# Patient Record
Sex: FEMALE | Race: WHITE | Employment: FULL TIME | URBAN - METROPOLITAN AREA
[De-identification: names, ages, dates, MRNs, and addresses within clinical notes are randomized per-mention and may not be internally consistent; named-entity substitution may affect disease eponyms.]

---

## 2018-09-30 ENCOUNTER — OFFICE VISIT (OUTPATIENT)
Dept: URGENT CARE | Facility: URGENT CARE | Age: 33
End: 2018-09-30
Payer: COMMERCIAL

## 2018-09-30 ENCOUNTER — RADIANT APPOINTMENT (OUTPATIENT)
Dept: GENERAL RADIOLOGY | Facility: CLINIC | Age: 33
End: 2018-09-30
Attending: NURSE PRACTITIONER
Payer: COMMERCIAL

## 2018-09-30 VITALS
WEIGHT: 148.2 LBS | HEART RATE: 68 BPM | TEMPERATURE: 97.7 F | DIASTOLIC BLOOD PRESSURE: 62 MMHG | SYSTOLIC BLOOD PRESSURE: 104 MMHG | OXYGEN SATURATION: 100 %

## 2018-09-30 DIAGNOSIS — S69.91XA INJURY OF FINGER OF RIGHT HAND, INITIAL ENCOUNTER: ICD-10-CM

## 2018-09-30 DIAGNOSIS — S69.91XA INJURY OF FINGER OF RIGHT HAND, INITIAL ENCOUNTER: Primary | ICD-10-CM

## 2018-09-30 PROCEDURE — 73140 X-RAY EXAM OF FINGER(S): CPT | Mod: RT

## 2018-09-30 PROCEDURE — 99203 OFFICE O/P NEW LOW 30 MIN: CPT | Performed by: NURSE PRACTITIONER

## 2018-09-30 NOTE — MR AVS SNAPSHOT
After Visit Summary   9/30/2018    Nataliia Cheatham    MRN: [id number]           Patient Information     Date Of Birth          1985        Visit Information        Provider Department      9/30/2018 9:05 AM Kayla Dodson APRN [facility]        Today's Diagnoses     Injury of finger of right hand, initial encounter    -  1      Care Instructions      Finger Contusion  You have a contusion. This is also called a bruise. There is swelling and some bleeding under the skin, but no broken bones. This injury generally takes a few days to a few weeks to heal. During that time, the bruise will typically change in color from reddish, to purple-blue, to greenish-yellow, then to yellow-brown.  A finger contusion may be treated with a splint or anjelica tape (taping the injured finger to the one next to it for support). Minor contusions likely will need no other treatment.  Home care    Elevate the hand to reduce pain and swelling. As much as possible, sit or lie down with the hand raised about the level of your heart. This is especially important during the first 48 hours.    Ice the finger to help reduce pain and swelling. Wrap a cold source (ice pack or ice cubes in a plastic bag) in a thin towel. Apply to the bruised finger for 20 minutes every 1 to 2 hours the first day. Continue this 3 to 4 times a day until the pain and swelling goes away.    If anjelica tape was applied and it becomes wet or dirty, change it. You may replace it with paper, plastic, or cloth tape. Before taping, put a thin strip of cotton or gauze between the fingers to absorb sweat. This will help prevent any breakdown of skin or fungal infections.     Unless another medicine was prescribed, you can take acetaminophen, ibuprofen, or naproxen to control pain. (If you have chronic liver or kidney disease or ever had a stomach ulcer or gastrointestinal bleeding, talk with your doctor before using these  medicines.)  Follow up  Follow up with your healthcare provider, or as advised. Call if you are not improving within 1 to 2 weeks.  When to seek medical advice   Call your healthcare provider right away if you have any of the following:    Increased pain or swelling    Hand or arm becomes cold, blue, numb or tingly    Signs of infection: Warmth, drainage, or increased redness or pain around the bruise    Inability to move the injured finger or hand     Frequent bruising for unknown reasons  Date Last Reviewed: 5/1/2017 2000-2017 [employer]. [address] [place] [address]. All rights reserved. This information is not intended as a substitute for professional medical care. Always follow your healthcare professional's instructions.                Follow-ups after your visit        Who to contact     If you have questions or need follow up information about today's clinic visit or your schedule please contact [facility] directly at [phone].  Normal or non-critical lab and imaging results will be communicated to you by MyChart, letter or phone within 4 business days after the clinic has received the results. If you do not hear from us within 7 days, please contact the clinic through MyChart or phone. If you have a critical or abnormal lab result, we will notify you by phone as soon as possible.  Submit refill requests through [employer] or call your pharmacy and they will forward the refill request to us. Please allow 3 business days for your refill to be completed.          Additional Information About Your Visit        Care EveryWhere ID     This is your Care EveryWhere ID. This could be used by other organizations to access your [place] medical records  [id number]        Your Vitals Were     Pulse Temperature Pulse Oximetry             68 97.7  F (36.5  C) (Tympanic) 100%          Blood Pressure from Last 3 Encounters:   09/30/18 104/62    Weight from Last 3 Encounters:    09/30/18 148 lb 3.2 oz (67.2 kg)               Primary Care Provider    None Specified       No primary provider on file.        Equal Access to Services     VITO MADRIGAL : Hadii aad ku hadphaniaidan Leylastella, lorrainequentin franzjinha, dane randayandy serrano, dorita dunn giorgicuauhtemoc cain laLukaszshivam alina. So [facility] [phone].    ATENCIÓN: Si habla español, tiene a bowling disposición servicios gratuitos de asistencia lingüística. Llame al [phone].    We comply with applicable federal civil rights laws and [place] laws. We do not discriminate on the basis of race, color, national origin, age, disability, sex, sexual orientation, or gender identity.            Thank you!     Thank you for [facility]. Our goal is always to provide you with excellent care. Hearing back from our patients is one way we can continue to improve our services. Please take a few minutes to complete the written survey that you may receive in the mail after your visit with us. Thank you!             Your Updated Medication List - Protect others around you: Learn how to safely use, store and throw away your medicines at www.disposemymeds.org.      Notice  As of 9/30/2018 10:34 AM    You have not been prescribed any medications.

## 2018-09-30 NOTE — PROGRESS NOTES
SUBJECTIVE:  Nataliia Cheatham is a 33 year old female who sustained a right finger injury 1 hours ago. Mechanism of injury: slammed in car door. Immediate symptoms: immediate pain, immediate swelling. Symptoms have been improving since that time. Prior history of related problems: no prior problems with this area in the past.    No past medical history on file.  No current outpatient prescriptions on file.     No current facility-administered medications for this visit.       No Known Allergies    Social History     Social History     Marital status:      Spouse name: N/A     Number of children: N/A     Years of education: N/A     Social History Main Topics     Smoking status: Not on file     Smokeless tobacco: Not on file     Alcohol use Not on file     Drug use: Not on file     Sexual activity: Not on file     Other Topics Concern     Not on file     Social History Narrative       OBJECTIVE:  /62  Pulse 68  Temp 97.7  F (36.5  C) (Tympanic)  Wt 148 lb 3.2 oz (67.2 kg)  SpO2 100%  Appearance: in no apparent distress.  Hand exam: soft tissue tenderness and swelling 2nd digit right hand, reduced range of motion of at knuckle  X-ray: no fracture or dislocation noted.    ASSESSMENT:  Finger contusion    PLAN:  Finger splint applied  NSAID, ice suggested    Home treat and monitor symptoms, call or rtc if worsening or not improving      ZAN Overton CNP    
Doris Barton (), Residency  Admin  [address] [place] [address]  Phone: [phone]  Fax: [phone]

## 2018-09-30 NOTE — PATIENT INSTRUCTIONS
Finger Contusion  You have a contusion. This is also called a bruise. There is swelling and some bleeding under the skin, but no broken bones. This injury generally takes a few days to a few weeks to heal. During that time, the bruise will typically change in color from reddish, to purple-blue, to greenish-yellow, then to yellow-brown.  A finger contusion may be treated with a splint or anjelica tape (taping the injured finger to the one next to it for support). Minor contusions likely will need no other treatment.  Home care    Elevate the hand to reduce pain and swelling. As much as possible, sit or lie down with the hand raised about the level of your heart. This is especially important during the first 48 hours.    Ice the finger to help reduce pain and swelling. Wrap a cold source (ice pack or ice cubes in a plastic bag) in a thin towel. Apply to the bruised finger for 20 minutes every 1 to 2 hours the first day. Continue this 3 to 4 times a day until the pain and swelling goes away.    If anjelica tape was applied and it becomes wet or dirty, change it. You may replace it with paper, plastic, or cloth tape. Before taping, put a thin strip of cotton or gauze between the fingers to absorb sweat. This will help prevent any breakdown of skin or fungal infections.     Unless another medicine was prescribed, you can take acetaminophen, ibuprofen, or naproxen to control pain. (If you have chronic liver or kidney disease or ever had a stomach ulcer or gastrointestinal bleeding, talk with your doctor before using these medicines.)  Follow up  Follow up with your healthcare provider, or as advised. Call if you are not improving within 1 to 2 weeks.  When to seek medical advice   Call your healthcare provider right away if you have any of the following:    Increased pain or swelling    Hand or arm becomes cold, blue, numb or tingly    Signs of infection: Warmth, drainage, or increased redness or pain around the  bruise    Inability to move the injured finger or hand     Frequent bruising for unknown reasons  Date Last Reviewed: 5/1/2017 2000-2017 [employer]. [address] [place] [address], [place] [address]. All rights reserved. This information is not intended as a substitute for professional medical care. Always follow your healthcare professional's instructions.

## 2020-01-13 ENCOUNTER — NURSE TRIAGE (OUTPATIENT)
Dept: FAMILY MEDICINE | Facility: CLINIC | Age: 35
End: 2020-01-13

## 2020-01-13 NOTE — TELEPHONE ENCOUNTER
Patient calling is having heart palpitations, on and off since Friday. Sending caller to triage.   Patient hung up please call patient at number listed. Thank you

## 2020-01-13 NOTE — TELEPHONE ENCOUNTER
"  Reason for Disposition    [1] Palpitations AND [2] no improvement after using CARE ADVICE    Additional Information    Negative: Passed out (i.e., lost consciousness, collapsed and was not responding)    Negative: Shock suspected (e.g., cold/pale/clammy skin, too weak to stand, low BP, rapid pulse)    Negative: Difficult to awaken or acting confused (e.g., disoriented, slurred speech)    Negative: Visible sweat on face or sweat dripping down face    Negative: Unable to walk, or can only walk with assistance (e.g., requires support)    Negative: [1] Received SHOCK from implantable cardiac defibrillator AND [2] persisting symptoms (i.e., palpitations, lightheadedness)    Negative: Sounds like a life-threatening emergency to the triager    Negative: Difficulty breathing    Negative: Dizziness, lightheadedness, or weakness    Negative: [1] Heart beating very rapidly (e.g., > 140 / minute) AND [2] present now  (Exception: during exercise)    Negative: Heart beating very slowly (e.g., < 50 / minute)  (Exception: athlete)    Negative: New or worsened shortness of breath with activity (dyspnea on exertion)    Negative: Patient sounds very sick or weak to the triager    Negative: [1] Heart beating very rapidly (e.g., > 140 / minute) AND [2] not present now  (Exception: during exercise)    Negative: [1] Skipped or extra beat(s) AND [2] increases with exercise or exertion    Negative: [1] Skipped or extra beat(s) AND [2] occurs 4 or more times per minute    Negative: New or worsened ankle swelling    Negative: History of heart disease  (i.e., heart attack, bypass surgery, angina, angioplasty, CHF) (Exception: brief heart beat symptoms that went away and now feels well)    Negative: Age > 60 years (Exception: brief heart beat symptoms that went away and now feels well)    Negative: Taking water pill (i.e., diuretic) or heart medication (e.g., digoxin)    Negative: Wearing a \"holter monitor\" or \"cardiac event monitor\"    " "Negative: [1] Received SHOCK from implantable cardiac defibrillator AND [2] now feels well    Answer Assessment - Initial Assessment Questions  1. DESCRIPTION: \"Please describe your heart rate or heart beat that you are having\" (e.g., fast/slow, regular/irregular, skipped or extra beats, \"palpitations\")      Feels like an extra beat, on and off, worse on Sat.  Less today. randomly  2. ONSET: \"When did it start?\" (Minutes, hours or days)       Friday  3. DURATION: \"How long does it last\" (e.g., seconds, minutes, hours)      Today for a couple of sec, on weekend for minutes  4. PATTERN \"Does it come and go, or has it been constant since it started?\"  \"Does it get worse with exertion?\"   \"Are you feeling it now?\"      random  5. TAP: \"Using your hand, can you tap out what you are feeling on a chair or table in front of you, so that I can hear?\" (Note: not all patients can do this)        Not asked  6. HEART RATE: \"Can you tell me your heart rate?\" \"How many beats in 15 seconds?\"  (Note: not all patients can do this)        Not racing.   7. RECURRENT SYMPTOM: \"Have you ever had this before?\" If so, ask: \"When was the last time?\" and \"What happened that time?\"       no  8. CAUSE: \"What do you think is causing the palpitations?\"      Unknown.  9. CARDIAC HISTORY: \"Do you have any history of heart disease?\" (e.g., heart attack, angina, bypass surgery, angioplasty, arrhythmia)       None, no family hx of young cardiac issues.  10. OTHER SYMPTOMS: \"Do you have any other symptoms?\" (e.g., dizziness, chest pain, sweating, difficulty breathing)        No other sx.   11. PREGNANCY: \"Is there any chance you are pregnant?\" \"When was your last menstrual period?\"        no    Protocols used: HEART RATE AND HEARTBEAT [id number]    "

## 2020-01-13 NOTE — PROGRESS NOTES
"Subjective     Nataliia Cheatham is a 34 year old female who presents to clinic today for the following health issues:    HPI   palpatations       Duration: 5 days     Description (location/character/radiation): feels  Strong beat of heart about every 20-30    Intensity:  Less in the past few days and none today     Accompanying signs and symptoms: none    History (similar episodes/previous evaluation): None    Precipitating or alleviating factors: None    Therapies tried and outcome: slow breathing, stay calm, exercise     Pt with intermittent palpitations starting about 5 days ago  Has been decreasing since then  None felt today  Seemed like it beat harder every 20-30 beats  Mainly noticed when she sat down.   No family history of cardiac problems  No other symptoms with this  No chest pain/tightness/sob/dizziness with this        Reviewed and updated as needed this visit by Provider         Review of Systems   ROS COMP: Constitutional, HEENT, cardiovascular, pulmonary, gi and gu systems are negative, except as otherwise noted.      Objective    /63   Pulse 81   Temp 98.6  F (37  C) (Oral)   Ht 1.715 m (5' 7.5\")   Wt 63.5 kg (140 lb)   BMI 21.60 kg/m    Body mass index is 21.6 kg/m .  Physical Exam   GENERAL: healthy, alert and no distress  NECK: no adenopathy, no asymmetry, masses, or scars and thyroid normal to palpation  RESP: lungs clear to auscultation - no rales, rhonchi or wheezes  CV: regular rate and rhythm, normal S1 S2, no S3 or S4, no murmur, click or rub, no peripheral edema and peripheral pulses strong  ABDOMEN: soft, nontender, no hepatosplenomegaly, no masses and bowel sounds normal  MS: no gross musculoskeletal defects noted, no edema    Diagnostic Test Results:  Labs reviewed in Epic  EKG - negative except PVC        Assessment & Plan     1. Palpitations  Probably benign PVCs, reassured pt. Will check labwork. Follow-up if worsens or is bothersome  - EKG 12-lead complete w/read - " Clinics  - Comprehensive metabolic panel  - CBC with platelets  - TSH with free T4 reflex           No follow-ups on file.    Lashawn Watts MD  [facility]

## 2020-01-14 ENCOUNTER — OFFICE VISIT (OUTPATIENT)
Dept: FAMILY MEDICINE | Facility: CLINIC | Age: 35
End: 2020-01-14
Payer: COMMERCIAL

## 2020-01-14 VITALS
SYSTOLIC BLOOD PRESSURE: 107 MMHG | HEART RATE: 81 BPM | BODY MASS INDEX: 21.22 KG/M2 | HEIGHT: 68 IN | DIASTOLIC BLOOD PRESSURE: 63 MMHG | WEIGHT: 140 LBS | TEMPERATURE: 98.6 F

## 2020-01-14 DIAGNOSIS — R00.2 PALPITATIONS: Primary | ICD-10-CM

## 2020-01-14 LAB
ALBUMIN SERPL-MCNC: 4 G/DL (ref 3.4–5)
ALP SERPL-CCNC: 58 U/L (ref 40–150)
ALT SERPL W P-5'-P-CCNC: 16 U/L (ref 0–50)
ANION GAP SERPL CALCULATED.3IONS-SCNC: 4 MMOL/L (ref 3–14)
AST SERPL W P-5'-P-CCNC: 12 U/L (ref 0–45)
BILIRUB SERPL-MCNC: 0.5 MG/DL (ref 0.2–1.3)
BUN SERPL-MCNC: 12 MG/DL (ref 7–30)
CALCIUM SERPL-MCNC: 8.9 MG/DL (ref 8.5–10.1)
CHLORIDE SERPL-SCNC: 108 MMOL/L (ref 94–109)
CO2 SERPL-SCNC: 28 MMOL/L (ref 20–32)
CREAT SERPL-MCNC: 0.67 MG/DL (ref 0.52–1.04)
ERYTHROCYTE [DISTWIDTH] IN BLOOD BY AUTOMATED COUNT: 12.7 % (ref 10–15)
GFR SERPL CREATININE-BSD FRML MDRD: >90 ML/MIN/{1.73_M2}
GLUCOSE SERPL-MCNC: 84 MG/DL (ref 70–99)
HCT VFR BLD AUTO: 42 % (ref 35–47)
HGB BLD-MCNC: 13.5 G/DL (ref 11.7–15.7)
MCH RBC QN AUTO: 29.8 PG (ref 26.5–33)
MCHC RBC AUTO-ENTMCNC: 32.1 G/DL (ref 31.5–36.5)
MCV RBC AUTO: 93 FL (ref 78–100)
PLATELET # BLD AUTO: 100 10E9/L (ref 150–450)
POTASSIUM SERPL-SCNC: 4.2 MMOL/L (ref 3.4–5.3)
PROT SERPL-MCNC: 6.9 G/DL (ref 6.8–8.8)
RBC # BLD AUTO: 4.53 10E12/L (ref 3.8–5.2)
SODIUM SERPL-SCNC: 140 MMOL/L (ref 133–144)
TSH SERPL DL<=0.005 MIU/L-ACNC: 0.96 MU/L (ref 0.4–4)
WBC # BLD AUTO: 2.7 10E9/L (ref 4–11)

## 2020-01-14 PROCEDURE — 85027 COMPLETE CBC AUTOMATED: CPT | Performed by: FAMILY MEDICINE

## 2020-01-14 PROCEDURE — 84443 ASSAY THYROID STIM HORMONE: CPT | Performed by: FAMILY MEDICINE

## 2020-01-14 PROCEDURE — 36415 COLL VENOUS BLD VENIPUNCTURE: CPT | Performed by: FAMILY MEDICINE

## 2020-01-14 PROCEDURE — 93000 ELECTROCARDIOGRAM COMPLETE: CPT | Performed by: FAMILY MEDICINE

## 2020-01-14 PROCEDURE — 99203 OFFICE O/P NEW LOW 30 MIN: CPT | Performed by: FAMILY MEDICINE

## 2020-01-14 PROCEDURE — 80053 COMPREHEN METABOLIC PANEL: CPT | Performed by: FAMILY MEDICINE

## 2020-01-14 ASSESSMENT — MIFFLIN-ST. JEOR: SCORE: 1370.6

## 2020-01-14 NOTE — LETTER
January 14, 2020    Nataliia Cheatham  [address] [address] [facility] [address]            Dear Nataliia,    Your thyroid is normal   Your electrolytes, liver and kidney function are normal   Your hemoglobin is normal.     Below is a copy of the results.  It was a pleasure to see you at your last appointment.    If you have any questions or concerns, please call myself or my nurse at [phone].    Sincerely,  Lashawn Watts MD/[facility]    Results for orders placed or performed in visit on 01/14/20   Comprehensive metabolic panel     Status: None   Result Value Ref Range    Sodium 140 133 - 144 mmol/L    Potassium 4.2 3.4 - 5.3 mmol/L    Chloride 108 94 - 109 mmol/L    Carbon Dioxide 28 20 - 32 mmol/L    Anion Gap 4 3 - 14 mmol/L    Glucose 84 70 - 99 mg/dL    Urea Nitrogen 12 7 - 30 mg/dL    Creatinine 0.67 0.52 - 1.04 mg/dL    GFR Estimate >90 >60 mL/min/[1.73_m2]    GFR Estimate If Black >90 >60 mL/min/[1.73_m2]    Calcium 8.9 8.5 - 10.1 mg/dL    Bilirubin Total 0.5 0.2 - 1.3 mg/dL    Albumin 4.0 3.4 - 5.0 g/dL    Protein Total 6.9 6.8 - 8.8 g/dL    Alkaline Phosphatase 58 40 - 150 U/L    ALT 16 0 - 50 U/L    AST 12 0 - 45 U/L   CBC with platelets     Status: Abnormal   Result Value Ref Range    WBC 2.7 (L) 4.0 - 11.0 10e9/L    RBC Count 4.53 3.8 - 5.2 10e12/L    Hemoglobin 13.5 11.7 - 15.7 g/dL    Hematocrit 42.0 35.0 - 47.0 %    MCV 93 78 - 100 fl    MCH 29.8 26.5 - 33.0 pg    MCHC 32.1 31.5 - 36.5 g/dL    RDW 12.7 10.0 - 15.0 %    Platelet Count 100 (L) 150 - 450 10e9/L   TSH with free T4 reflex     Status: None   Result Value Ref Range    TSH 0.96 0.40 - 4.00 mU/L